# Patient Record
Sex: MALE | NOT HISPANIC OR LATINO | URBAN - METROPOLITAN AREA
[De-identification: names, ages, dates, MRNs, and addresses within clinical notes are randomized per-mention and may not be internally consistent; named-entity substitution may affect disease eponyms.]

---

## 2023-05-08 PROBLEM — Z00.00 ENCOUNTER FOR PREVENTIVE HEALTH EXAMINATION: Status: ACTIVE | Noted: 2023-05-08

## 2023-05-29 ENCOUNTER — OUTPATIENT (OUTPATIENT)
Dept: OUTPATIENT SERVICES | Facility: HOSPITAL | Age: 43
LOS: 1 days | Discharge: HOME | End: 2023-05-29

## 2023-05-29 DIAGNOSIS — R79.9 ABNORMAL FINDING OF BLOOD CHEMISTRY, UNSPECIFIED: ICD-10-CM

## 2023-05-29 DIAGNOSIS — D64.9 ANEMIA, UNSPECIFIED: ICD-10-CM

## 2023-06-05 ENCOUNTER — RESULT REVIEW (OUTPATIENT)
Age: 43
End: 2023-06-05

## 2023-06-05 ENCOUNTER — NON-APPOINTMENT (OUTPATIENT)
Age: 43
End: 2023-06-05

## 2023-06-05 ENCOUNTER — APPOINTMENT (OUTPATIENT)
Dept: HEMATOLOGY ONCOLOGY | Facility: CLINIC | Age: 43
End: 2023-06-05
Payer: COMMERCIAL

## 2023-06-05 VITALS
OXYGEN SATURATION: 95 % | SYSTOLIC BLOOD PRESSURE: 137 MMHG | HEIGHT: 64.57 IN | TEMPERATURE: 98.1 F | BODY MASS INDEX: 21.56 KG/M2 | WEIGHT: 127.87 LBS | DIASTOLIC BLOOD PRESSURE: 87 MMHG | RESPIRATION RATE: 16 BRPM | HEART RATE: 83 BPM

## 2023-06-05 DIAGNOSIS — Z87.09 PERSONAL HISTORY OF OTHER DISEASES OF THE RESPIRATORY SYSTEM: ICD-10-CM

## 2023-06-05 DIAGNOSIS — Z78.9 OTHER SPECIFIED HEALTH STATUS: ICD-10-CM

## 2023-06-05 LAB
BASOPHILS # BLD AUTO: 0.04 K/UL — SIGNIFICANT CHANGE UP (ref 0–0.2)
BASOPHILS NFR BLD AUTO: 0.7 % — SIGNIFICANT CHANGE UP (ref 0–2)
EOSINOPHIL # BLD AUTO: 0.07 K/UL — SIGNIFICANT CHANGE UP (ref 0–0.5)
EOSINOPHIL NFR BLD AUTO: 1.2 % — SIGNIFICANT CHANGE UP (ref 0–6)
ERYTHROCYTE [SEDIMENTATION RATE] IN BLOOD: 21 MM/HR — HIGH (ref 0–15)
HCT VFR BLD CALC: 39.7 % — SIGNIFICANT CHANGE UP (ref 39–50)
HGB BLD-MCNC: 12.3 G/DL — LOW (ref 13–17)
IMM GRANULOCYTES NFR BLD AUTO: 0.3 % — SIGNIFICANT CHANGE UP (ref 0–0.9)
LYMPHOCYTES # BLD AUTO: 1.34 K/UL — SIGNIFICANT CHANGE UP (ref 1–3.3)
LYMPHOCYTES # BLD AUTO: 22.2 % — SIGNIFICANT CHANGE UP (ref 13–44)
MCHC RBC-ENTMCNC: 19.4 PG — LOW (ref 27–34)
MCHC RBC-ENTMCNC: 31 G/DL — LOW (ref 32–36)
MCV RBC AUTO: 62.5 FL — LOW (ref 80–100)
MONOCYTES # BLD AUTO: 0.88 K/UL — SIGNIFICANT CHANGE UP (ref 0–0.9)
MONOCYTES NFR BLD AUTO: 14.6 % — HIGH (ref 2–14)
NEUTROPHILS # BLD AUTO: 3.69 K/UL — SIGNIFICANT CHANGE UP (ref 1.8–7.4)
NEUTROPHILS NFR BLD AUTO: 61 % — SIGNIFICANT CHANGE UP (ref 43–77)
NRBC # BLD: 0 /100 WBCS — SIGNIFICANT CHANGE UP (ref 0–0)
PLATELET # BLD AUTO: 219 K/UL — SIGNIFICANT CHANGE UP (ref 150–400)
RBC # BLD: 6.35 M/UL — HIGH (ref 4.2–5.8)
RBC # FLD: 17.7 % — HIGH (ref 10.3–14.5)
RETICS #: 83.8 K/UL — SIGNIFICANT CHANGE UP (ref 25–125)
RETICS/RBC NFR: 1.3 % — SIGNIFICANT CHANGE UP (ref 0.5–2.5)
WBC # BLD: 6.04 K/UL — SIGNIFICANT CHANGE UP (ref 3.8–10.5)
WBC # FLD AUTO: 6.04 K/UL — SIGNIFICANT CHANGE UP (ref 3.8–10.5)

## 2023-06-05 PROCEDURE — 99205 OFFICE O/P NEW HI 60 MIN: CPT

## 2023-06-05 RX ORDER — TRIAMCINOLONE ACETONIDE 1 MG/G
0.1 CREAM TOPICAL
Qty: 45 | Refills: 0 | Status: DISCONTINUED | COMMUNITY
Start: 2023-02-02

## 2023-06-05 RX ORDER — ALBUTEROL SULFATE 90 UG/1
108 (90 BASE) INHALANT RESPIRATORY (INHALATION)
Qty: 18 | Refills: 0 | Status: DISCONTINUED | COMMUNITY
Start: 2023-04-19

## 2023-06-05 RX ORDER — ACETAMINOPHEN 325 MG/1
325 TABLET ORAL
Qty: 30 | Refills: 0 | Status: DISCONTINUED | COMMUNITY
Start: 2023-01-18

## 2023-06-05 RX ORDER — IPRATROPIUM BROMIDE 21 UG/1
0.03 SPRAY NASAL
Qty: 30 | Refills: 0 | Status: DISCONTINUED | COMMUNITY
Start: 2023-01-18

## 2023-06-05 RX ORDER — BENZONATATE 100 MG/1
100 CAPSULE ORAL
Qty: 30 | Refills: 0 | Status: DISCONTINUED | COMMUNITY
Start: 2023-01-18

## 2023-06-05 RX ORDER — NIRMATRELVIR AND RITONAVIR 300-100 MG
20 X 150 MG & KIT ORAL
Qty: 30 | Refills: 0 | Status: DISCONTINUED | COMMUNITY
Start: 2023-01-18

## 2023-06-05 RX ORDER — COVID-19 ANTIGEN TEST
KIT MISCELLANEOUS
Qty: 8 | Refills: 0 | Status: DISCONTINUED | COMMUNITY
Start: 2023-01-18

## 2023-06-05 RX ORDER — DEXTROMETHORPHAN HYDROBROMIDE AND GUAIFENESIN 10; 100 MG/5ML; MG/5ML
100-10 LIQUID ORAL
Qty: 120 | Refills: 0 | Status: DISCONTINUED | COMMUNITY
Start: 2023-04-19

## 2023-06-05 NOTE — CONSULT LETTER
[Dear  ___] : Dear  [unfilled], [Consult Letter:] : I had the pleasure of evaluating your patient, [unfilled]. [Please see my note below.] : Please see my note below. [Consult Closing:] : Thank you very much for allowing me to participate in the care of this patient.  If you have any questions, please do not hesitate to contact me. [Sincerely,] : Sincerely, [FreeTextEntry2] : Dr Pinon Sang

## 2023-06-05 NOTE — REVIEW OF SYSTEMS
[Negative] : Allergic/Immunologic [Fever] : fever [Recent Change In Weight] : ~T recent weight change [Cough] : cough [SOB on Exertion] : shortness of breath during exertion [FreeTextEntry6] : pleuritic pain

## 2023-06-05 NOTE — HISTORY OF PRESENT ILLNESS
[0 - No Distress] : Distress Level: 0 [90: Able to carry normal activity; minor signs or symptoms of disease.] : 90: Able to carry normal activity; minor signs or symptoms of disease.  [de-identified] : 41 yo gentleman with PMhx of asthma, bronchitis, referred for evaluation of anemia.\par \par Positive for fever 102 48 hrs ago ( positive for running nose, watery eyes, sneezing, cough yellowish phlegm). reports history of seasonal allergies. Denies night sweats, lost 5 pounds in 2 months. Reports having intermittent melena, unable to recall the time. Denies history of hemorrhoids. \par \par Labs WBC 4.6, Hb 12.7g/dl, hct 43%, MCV 63.8%, , ANC 2447, lymphocytes 1348, monocytes 442, eos 304, basos 60.

## 2023-06-05 NOTE — ASSESSMENT
[FreeTextEntry1] : 41 yo gentleman with PMhx of asthma, bronchitis, referred for evaluation of anemia.\par \par Positive for fever 102 48 hrs ago ( positive for running nose, watery eyes, sneezing, cough yellowish phlegm). reports history of seasonal allergies. Denies night sweats, lost 5 pounds in 2 months. Reports having intermittent melena, unable to recall the time. Denies history of hemorrhoids. \par \par Labs WBC 4.6, Hb 12.7g/dl, hct 43%, MCV 63.8%, , ANC 2447, lymphocytes 1348, monocytes 442, eos 304, basos 60. \par \par I had a detailed discussion today with the patient  and wife regarding the natural history, epidemiology, diagnosis,  and treatment of  anemia and elevated protein. I reviewed his laboratory  studies in detail today. I then recommended doing a complete anemia work up and MM work up.Will call patient with results and further recommendations.  I answered all their questions to satisfaction.\par \par Greater than 50% of the encounter time was spent on counseling and coordination of care for anemia/elevated protein     and I have spent  60   minutes of face to face time with the patient.\par \par RTC prn. \par \par \par

## 2023-06-06 LAB
ALBUMIN SERPL ELPH-MCNC: 4.7 G/DL
ALP BLD-CCNC: 73 U/L
ALT SERPL-CCNC: 17 U/L
ANION GAP SERPL CALC-SCNC: 15 MMOL/L
AST SERPL-CCNC: 17 U/L
B2 MICROGLOB SERPL-MCNC: 2.4 MG/L
BILIRUB SERPL-MCNC: 0.3 MG/DL
BUN SERPL-MCNC: 15 MG/DL
CALCIUM SERPL-MCNC: 9 MG/DL
CHLORIDE SERPL-SCNC: 101 MMOL/L
CO2 SERPL-SCNC: 24 MMOL/L
CREAT SERPL-MCNC: 0.86 MG/DL
CRP SERPL-MCNC: 27 MG/L
DEPRECATED KAPPA LC FREE/LAMBDA SER: 1.22 RATIO
EGFR: 111 ML/MIN/1.73M2
FERRITIN SERPL-MCNC: 555 NG/ML
FOLATE SERPL-MCNC: 10.8 NG/ML
FREE KAPPA URINE: 112.96 MG/L
FREE KAPPA/LAMDA RATIO: 4.89 RATIO
FREE LAMDA URINE: 23.1 MG/L
GLUCOSE SERPL-MCNC: 91 MG/DL
IGA SER QL IEP: 235 MG/DL
IGG SER QL IEP: 1995 MG/DL
IGM SER QL IEP: 60 MG/DL
IRON SATN MFR SERPL: 9 %
IRON SERPL-MCNC: 23 UG/DL
KAPPA LC CSF-MCNC: 2.78 MG/DL
KAPPA LC SERPL-MCNC: 3.4 MG/DL
LDH SERPL-CCNC: 183 U/L
POTASSIUM SERPL-SCNC: 4.1 MMOL/L
PROT SERPL-MCNC: 8.1 G/DL
SODIUM SERPL-SCNC: 139 MMOL/L
TIBC SERPL-MCNC: 248 UG/DL
UIBC SERPL-MCNC: 225 UG/DL
VIT B12 SERPL-MCNC: 484 PG/ML

## 2023-06-08 ENCOUNTER — NON-APPOINTMENT (OUTPATIENT)
Age: 43
End: 2023-06-08

## 2023-06-08 LAB
ALBUMIN MFR SERPL ELPH: 52.9 %
ALBUMIN SERPL-MCNC: 4.3 G/DL
ALBUMIN/GLOB SERPL: 1.1 RATIO
ALBUPE: 8.7 %
ALPHA1 GLOB MFR SERPL ELPH: 4.8 %
ALPHA1 GLOB SERPL ELPH-MCNC: 0.4 G/DL
ALPHA1UPE: 46.6 %
ALPHA2 GLOB MFR SERPL ELPH: 9.4 %
ALPHA2 GLOB SERPL ELPH-MCNC: 0.8 G/DL
ALPHA2UPE: 15.5 %
B-GLOBULIN MFR SERPL ELPH: 11.2 %
B-GLOBULIN SERPL ELPH-MCNC: 0.9 G/DL
BETAUPE: 16.4 %
GAMMA GLOB FLD ELPH-MCNC: 1.8 G/DL
GAMMA GLOB MFR SERPL ELPH: 21.7 %
GAMMAUPE: 12.8 %
HGB A MFR BLD: 93.2 %
HGB A2 MFR BLD: 5.8 %
HGB F MFR BLD: 1 %
HGB FRACT BLD-IMP: NORMAL
IGA 24H UR QL IFE: NORMAL
INTERPRETATION SERPL IEP-IMP: NORMAL
KAPPA LC 24H UR QL: NORMAL
M PROTEIN SPEC IFE-MCNC: NORMAL
PROT PATTERN 24H UR ELPH-IMP: NORMAL
PROT SERPL-MCNC: 8.1 G/DL
PROT SERPL-MCNC: 8.1 G/DL
PROT UR-MCNC: 9 MG/DL
PROT UR-MCNC: 9 MG/DL

## 2023-06-16 ENCOUNTER — APPOINTMENT (OUTPATIENT)
Dept: HEMATOLOGY ONCOLOGY | Facility: CLINIC | Age: 43
End: 2023-06-16
Payer: COMMERCIAL

## 2023-06-16 ENCOUNTER — LABORATORY RESULT (OUTPATIENT)
Age: 43
End: 2023-06-16

## 2023-06-16 ENCOUNTER — RESULT REVIEW (OUTPATIENT)
Age: 43
End: 2023-06-16

## 2023-06-16 VITALS
TEMPERATURE: 96.4 F | BODY MASS INDEX: 21.93 KG/M2 | SYSTOLIC BLOOD PRESSURE: 125 MMHG | WEIGHT: 130.07 LBS | OXYGEN SATURATION: 99 % | RESPIRATION RATE: 16 BRPM | DIASTOLIC BLOOD PRESSURE: 88 MMHG | HEART RATE: 77 BPM

## 2023-06-16 LAB
BASOPHILS # BLD AUTO: 0.06 K/UL — SIGNIFICANT CHANGE UP (ref 0–0.2)
BASOPHILS NFR BLD AUTO: 0.8 % — SIGNIFICANT CHANGE UP (ref 0–2)
EOSINOPHIL # BLD AUTO: 0.13 K/UL — SIGNIFICANT CHANGE UP (ref 0–0.5)
EOSINOPHIL NFR BLD AUTO: 1.8 % — SIGNIFICANT CHANGE UP (ref 0–6)
HCT VFR BLD CALC: 41.1 % — SIGNIFICANT CHANGE UP (ref 39–50)
HGB BLD-MCNC: 12.7 G/DL — LOW (ref 13–17)
IMM GRANULOCYTES NFR BLD AUTO: 0.4 % — SIGNIFICANT CHANGE UP (ref 0–0.9)
LYMPHOCYTES # BLD AUTO: 1.91 K/UL — SIGNIFICANT CHANGE UP (ref 1–3.3)
LYMPHOCYTES # BLD AUTO: 26 % — SIGNIFICANT CHANGE UP (ref 13–44)
MCHC RBC-ENTMCNC: 18.9 PG — LOW (ref 27–34)
MCHC RBC-ENTMCNC: 30.9 G/DL — LOW (ref 32–36)
MCV RBC AUTO: 61.2 FL — LOW (ref 80–100)
MONOCYTES # BLD AUTO: 0.61 K/UL — SIGNIFICANT CHANGE UP (ref 0–0.9)
MONOCYTES NFR BLD AUTO: 8.3 % — SIGNIFICANT CHANGE UP (ref 2–14)
NEUTROPHILS # BLD AUTO: 4.62 K/UL — SIGNIFICANT CHANGE UP (ref 1.8–7.4)
NEUTROPHILS NFR BLD AUTO: 62.7 % — SIGNIFICANT CHANGE UP (ref 43–77)
NRBC # BLD: 0 /100 WBCS — SIGNIFICANT CHANGE UP (ref 0–0)
PLATELET # BLD AUTO: 310 K/UL — SIGNIFICANT CHANGE UP (ref 150–400)
RBC # BLD: 6.72 M/UL — HIGH (ref 4.2–5.8)
RBC # FLD: 17.5 % — HIGH (ref 10.3–14.5)
WBC # BLD: 7.36 K/UL — SIGNIFICANT CHANGE UP (ref 3.8–10.5)
WBC # FLD AUTO: 7.36 K/UL — SIGNIFICANT CHANGE UP (ref 3.8–10.5)

## 2023-06-16 PROCEDURE — 38221 DX BONE MARROW BIOPSIES: CPT | Mod: RT

## 2023-06-16 NOTE — REASON FOR VISIT
[Bone Marrow Biopsy] : bone marrow biopsy [Bone Marrow Aspiration] : bone marrow aspiration [FreeTextEntry2] : Pt with elevated light chains. R/o MGUS vs plasma cell dyscrasia

## 2023-06-16 NOTE — PROCEDURE
[Bone Marrow Biopsy] : bone marrow biopsy [Bone Marrow Aspiration] : bone marrow aspiration  [Patient] : the patient [Verbal Consent Obtained] : verbal consent was obtained prior to the procedure [Patient identification verified] : patient identification verified [Procedure verified and consent obtained] : procedure verified and consent obtained [Laterality verified and correct site marked] : laterality verified and correct site marked [Right] : site: right [Correct positioning] : correct positioning [Prone] : prone [Superior iliac spine was identified] : the superior iliac spine was identified. [The right posterior iliac crest was prepped with betadine and draped, using sterile technique.] : The right posterior iliac crest was prepped with betadine and draped, using sterile technique. [Lidocaine was injected and into the periosteum overlying the site.] : Lidocaine was injected and into the periosteum overlying the site. [Aspirate] : aspirate [Cytogenetics] : cytogenetics [FISH] : FISH [Biopsy] : biopsy [Flow Cytometry] : flow cytometry [] : The patient was instructed to remove the bandage the following AM. The patient may bathe. Acetaminophen may be taken for discomfort, as per package directions.If there are any other problems, the patient was instructed to call the office. The patient verbalized understanding, and is aware of the office contact numbers. [FreeTextEntry1] : Pt with elevated light chains. R/o MGUS vs plasma cell dyscrasia [FreeTextEntry2] : 8 cc of 1% Lidocaine was used for the procedure\par \par WBC: 7.36 K/ul\par Hgb: 12.7 g/dL\par Hct: 41.1 %\par Plts: 310 K/uL\par \par Bone marrow aspiration and biopsy were done. Myeloma panel and congo red stain requested. \par Pressure applied > 15 min - no bleeding noted.

## 2024-03-18 ENCOUNTER — APPOINTMENT (OUTPATIENT)
Dept: PODIATRY | Facility: CLINIC | Age: 44
End: 2024-03-18

## 2024-03-28 ENCOUNTER — APPOINTMENT (OUTPATIENT)
Dept: PODIATRY | Facility: CLINIC | Age: 44
End: 2024-03-28
Payer: MEDICAID

## 2024-03-28 DIAGNOSIS — M21.40 FLAT FOOT [PES PLANUS] (ACQUIRED), UNSPECIFIED FOOT: ICD-10-CM

## 2024-03-28 DIAGNOSIS — M77.40 METATARSALGIA, UNSPECIFIED FOOT: ICD-10-CM

## 2024-03-28 DIAGNOSIS — L85.1 ACQUIRED KERATOSIS [KERATODERMA] PALMARIS ET PLANTARIS: ICD-10-CM

## 2024-03-28 DIAGNOSIS — M79.673 PAIN IN UNSPECIFIED FOOT: ICD-10-CM

## 2024-03-28 DIAGNOSIS — M77.8 OTHER ENTHESOPATHIES, NOT ELSEWHERE CLASSIFIED: ICD-10-CM

## 2024-03-28 PROCEDURE — 99203 OFFICE O/P NEW LOW 30 MIN: CPT

## 2024-04-01 PROBLEM — M79.673 PAIN, FOOT: Status: ACTIVE | Noted: 2024-04-01

## 2024-04-01 PROBLEM — L85.1 KERATODERMA, ACQUIRED: Status: ACTIVE | Noted: 2024-04-01

## 2024-04-01 PROBLEM — M21.40 FLATFOOT: Status: ACTIVE | Noted: 2024-04-01

## 2024-04-01 PROBLEM — M77.8 CAPSULITIS OF FOOT: Status: ACTIVE | Noted: 2024-04-01

## 2024-04-01 PROBLEM — M77.40 METATARSALGIA: Status: ACTIVE | Noted: 2024-04-01

## 2024-04-02 NOTE — HISTORY OF PRESENT ILLNESS
[FreeTextEntry1] : Patient presents today with complaint of multiple lesions on the bottom of both feet, which he states started to increase in pain over the past several months to a year. He states he has had issues in the past and was taken which was taken care of, but they returned. He also states that he had recently moved from an apartment in the city several years ago and has wood floors. He does not wear slippers or shoe gear in the house. He is usually barefoot or in socks. He presents today wearing sneaker. He is unrestricted on the type of shoe gear that he has to wear in regards to work. He denies any trauma to the feet.

## 2024-04-02 NOTE — PHYSICAL EXAM
[2+] : left foot dorsalis pedis 2+ [Varicose Veins Of Lower Extremities] : not present [FreeTextEntry3] : Temperature gradient within normal limits. CFT: 3 seconds x10  There is positive hair growth. 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 Medial collapse of the medial arch and flexible pes planus on the right foot. There is tailor's bunion on the left foot with a prominent 2nd metatarsal head on the right foot and a prominent 5th metatarsal head on the left. There is tenderness on palpation submet. 2, but negative pain on ROM of the lesser MPJ's or on palpation of the 2nd MPJ. Negative pain on ROM of the pedal joints. Muscle strength is 5/5.  [FreeTextEntry1] : Epicritic sensation and light touch are intact.

## 2024-04-02 NOTE — ASSESSMENT
[FreeTextEntry1] : Impression: Metatarsalgia. Pain in foot. Capsulitis right foot. Pes planus. IPK.   Treatment: Discussed findings and conditions with the patient. With patient's consent, all lesions were prepped and manually and mechanically debrided with a sterile #15 blade and trimmed without incidence. There was negative bleeding or underlying ulceration, foreign body or signs of verruca noted post-debridement. Discussed biomechanics with the patient. I recommended Powerstep 3/4 length insoles for shoes to try to off-load the metatarsal heads and properly align the foot to help off-load the 2nd MPJ and improve the biomechanics bilaterally. Discussed Oofos for the house. Avoid waling barefoot. Shoe gear was also discussed with the patient in depth. A metatarsal pad was applied to the insole of his right shoe, which he states felt comfortable upon leaving the office. Discussed possible recurrence of the lesions. He is to return if there is any increase in pain, swelling, changes in appearance of the foot, recurrence of the hyperkeratotic lesions. Discussed shoe gear modification and also he is to return at the next visit with shoes to discuss additional modifications, recommendations, possible insoles, orthotics. Patient is to return approximately 1 to 2 months or sooner if necessary.

## 2024-04-26 ENCOUNTER — OUTPATIENT (OUTPATIENT)
Dept: OUTPATIENT SERVICES | Facility: HOSPITAL | Age: 44
LOS: 1 days | Discharge: HOME | End: 2024-04-26

## 2024-04-26 DIAGNOSIS — R79.9 ABNORMAL FINDING OF BLOOD CHEMISTRY, UNSPECIFIED: ICD-10-CM

## 2024-04-26 DIAGNOSIS — D64.9 ANEMIA, UNSPECIFIED: ICD-10-CM

## 2024-04-30 DIAGNOSIS — R77.8 OTHER SPECIFIED ABNORMALITIES OF PLASMA PROTEINS: ICD-10-CM

## 2024-05-06 ENCOUNTER — NON-APPOINTMENT (OUTPATIENT)
Age: 44
End: 2024-05-06

## 2024-05-07 ENCOUNTER — LABORATORY RESULT (OUTPATIENT)
Age: 44
End: 2024-05-07

## 2024-05-07 ENCOUNTER — APPOINTMENT (OUTPATIENT)
Dept: HEMATOLOGY ONCOLOGY | Facility: CLINIC | Age: 44
End: 2024-05-07
Payer: MEDICAID

## 2024-05-07 ENCOUNTER — RESULT REVIEW (OUTPATIENT)
Age: 44
End: 2024-05-07

## 2024-05-07 VITALS
HEART RATE: 76 BPM | HEIGHT: 64.57 IN | OXYGEN SATURATION: 98 % | RESPIRATION RATE: 16 BRPM | DIASTOLIC BLOOD PRESSURE: 88 MMHG | WEIGHT: 136.68 LBS | SYSTOLIC BLOOD PRESSURE: 130 MMHG | BODY MASS INDEX: 23.05 KG/M2 | TEMPERATURE: 98.5 F

## 2024-05-07 DIAGNOSIS — D64.9 ANEMIA, UNSPECIFIED: ICD-10-CM

## 2024-05-07 LAB
BASOPHILS # BLD AUTO: 0.08 K/UL — SIGNIFICANT CHANGE UP (ref 0–0.2)
BASOPHILS NFR BLD AUTO: 1.3 % — SIGNIFICANT CHANGE UP (ref 0–2)
EOSINOPHIL # BLD AUTO: 0.26 K/UL — SIGNIFICANT CHANGE UP (ref 0–0.5)
EOSINOPHIL NFR BLD AUTO: 4.3 % — SIGNIFICANT CHANGE UP (ref 0–6)
HCT VFR BLD CALC: 41.8 % — SIGNIFICANT CHANGE UP (ref 39–50)
HGB BLD-MCNC: 12.9 G/DL — LOW (ref 13–17)
IMM GRANULOCYTES NFR BLD AUTO: 0.3 % — SIGNIFICANT CHANGE UP (ref 0–0.9)
LYMPHOCYTES # BLD AUTO: 1.93 K/UL — SIGNIFICANT CHANGE UP (ref 1–3.3)
LYMPHOCYTES # BLD AUTO: 32.1 % — SIGNIFICANT CHANGE UP (ref 13–44)
MCHC RBC-ENTMCNC: 19.3 PG — LOW (ref 27–34)
MCHC RBC-ENTMCNC: 30.9 G/DL — LOW (ref 32–36)
MCV RBC AUTO: 62.7 FL — LOW (ref 80–100)
MONOCYTES # BLD AUTO: 0.62 K/UL — SIGNIFICANT CHANGE UP (ref 0–0.9)
MONOCYTES NFR BLD AUTO: 10.3 % — SIGNIFICANT CHANGE UP (ref 2–14)
NEUTROPHILS # BLD AUTO: 3.1 K/UL — SIGNIFICANT CHANGE UP (ref 1.8–7.4)
NEUTROPHILS NFR BLD AUTO: 51.7 % — SIGNIFICANT CHANGE UP (ref 43–77)
NRBC # BLD: 0 /100 WBCS — SIGNIFICANT CHANGE UP (ref 0–0)
PLATELET # BLD AUTO: 301 K/UL — SIGNIFICANT CHANGE UP (ref 150–400)
RBC # BLD: 6.67 M/UL — HIGH (ref 4.2–5.8)
RBC # FLD: 17.9 % — HIGH (ref 10.3–14.5)
WBC # BLD: 6.01 K/UL — SIGNIFICANT CHANGE UP (ref 3.8–10.5)
WBC # FLD AUTO: 6.01 K/UL — SIGNIFICANT CHANGE UP (ref 3.8–10.5)

## 2024-05-07 PROCEDURE — 99214 OFFICE O/P EST MOD 30 MIN: CPT

## 2024-05-07 NOTE — ASSESSMENT
[FreeTextEntry1] : 42 yo gentleman with PMhx of asthma, bronchitis, referred for evaluation of anemia.  Positive for fever 102 48 hrs ago ( positive for running nose, watery eyes, sneezing, cough yellowish phlegm). reports history of seasonal allergies. Denies night sweats, lost 5 pounds in 2 months. Reports having intermittent melena, unable to recall the time. Denies history of hemorrhoids.   Labs :  Hb 12.9 g/dl, hct 41.8%, MCV 62.7%, .  Will check hemoglobin electrophoresis to r/o thalassemia minor.  Greater than 50% of the encounter time was spent on counseling and coordination of care for anemia/elevated protein     and I have spent 30   minutes of face to face time with the patient.  RTC prn.

## 2024-05-07 NOTE — REVIEW OF SYSTEMS
[Fever] : no fever [Recent Change In Weight] : ~T no recent weight change [Cough] : no cough [SOB on Exertion] : shortness of breath during exertion [Negative] : Constitutional

## 2024-05-07 NOTE — HISTORY OF PRESENT ILLNESS
[0 - No Distress] : Distress Level: 0 [90: Able to carry normal activity; minor signs or symptoms of disease.] : 90: Able to carry normal activity; minor signs or symptoms of disease.  [de-identified] : 43 yo gentleman with PMhx of asthma, bronchitis, referred for evaluation of anemia.\par  \par  Positive for fever 102 48 hrs ago ( positive for running nose, watery eyes, sneezing, cough yellowish phlegm). reports history of seasonal allergies. Denies night sweats, lost 5 pounds in 2 months. Reports having intermittent melena, unable to recall the time. Denies history of hemorrhoids. \par  \par  Labs WBC 4.6, Hb 12.7g/dl, hct 43%, MCV 63.8%, , ANC 2447, lymphocytes 1348, monocytes 442, eos 304, basos 60.  [de-identified] : Patient is feeling well.  6/21/23 BMB was normal. Normal FISH.   Patient's mother has thalassemia minor.  No other changes in medical, surgical or social history since 6/5/23.

## 2024-05-08 ENCOUNTER — TRANSCRIPTION ENCOUNTER (OUTPATIENT)
Age: 44
End: 2024-05-08

## 2024-05-09 LAB
ALBUMIN SERPL ELPH-MCNC: 4.8 G/DL
ALBUPE: 14.1 %
ALP BLD-CCNC: 64 U/L
ALPHA1UPE: 29.1 %
ALPHA2UPE: 20.1 %
ALT SERPL-CCNC: 24 U/L
ANION GAP SERPL CALC-SCNC: 13 MMOL/L
AST SERPL-CCNC: 23 U/L
B2 MICROGLOB SERPL-MCNC: 1.9 MG/L
BASOPHILS # BLD AUTO: 0.07 K/UL
BASOPHILS NFR BLD AUTO: 1.1 %
BETAUPE: 20.3 %
BILIRUB SERPL-MCNC: 0.4 MG/DL
BUN SERPL-MCNC: 19 MG/DL
CALCIUM SERPL-MCNC: 8.8 MG/DL
CHLORIDE SERPL-SCNC: 101 MMOL/L
CO2 SERPL-SCNC: 24 MMOL/L
CREAT SERPL-MCNC: 0.97 MG/DL
EGFR: 99 ML/MIN/1.73M2
EOSINOPHIL # BLD AUTO: 0.24 K/UL
EOSINOPHIL NFR BLD AUTO: 3.8 %
FERRITIN SERPL-MCNC: 539 NG/ML
FREE KAPPA URINE: 30.58 MG/L
FREE KAPPA/LAMDA RATIO: 2.95 RATIO
FREE LAMDA URINE: 10.36 MG/L
GAMMAUPE: 16.4 %
GLUCOSE SERPL-MCNC: 92 MG/DL
HCT VFR BLD CALC: 45 %
HGB A MFR BLD: 93.4 %
HGB A2 MFR BLD: 5.8 %
HGB BLD-MCNC: 13.1 G/DL
HGB F MFR BLD: 0.8 %
HGB FRACT BLD-IMP: NORMAL
IGA 24H UR QL IFE: NORMAL
IMM GRANULOCYTES NFR BLD AUTO: 0.2 %
IRON SATN MFR SERPL: 18 %
IRON SERPL-MCNC: 59 UG/DL
KAPPA LC 24H UR QL: NORMAL
LDH SERPL-CCNC: 200 U/L
LYMPHOCYTES # BLD AUTO: 2.03 K/UL
LYMPHOCYTES NFR BLD AUTO: 32.5 %
MAN DIFF?: NORMAL
MCHC RBC-ENTMCNC: 19.1 PG
MCHC RBC-ENTMCNC: 29.1 GM/DL
MCV RBC AUTO: 65.5 FL
MONOCYTES # BLD AUTO: 0.7 K/UL
MONOCYTES NFR BLD AUTO: 11.2 %
NEUTROPHILS # BLD AUTO: 3.19 K/UL
NEUTROPHILS NFR BLD AUTO: 51.2 %
PLATELET # BLD AUTO: 307 K/UL
POTASSIUM SERPL-SCNC: 4.1 MMOL/L
PROT PATTERN 24H UR ELPH-IMP: NORMAL
PROT SERPL-MCNC: 8.3 G/DL
PROT UR-MCNC: 7 MG/DL
PROT UR-MCNC: 7 MG/DL
PROT UR-MCNC: 8 MG/DL
RBC # BLD: 6.87 M/UL
RBC # FLD: 19.6 %
SODIUM SERPL-SCNC: 138 MMOL/L
TIBC SERPL-MCNC: 326 UG/DL
UIBC SERPL-MCNC: 267 UG/DL
WBC # FLD AUTO: 6.24 K/UL

## 2024-05-13 LAB
ALBUMIN MFR SERPL ELPH: 55.4 %
ALBUMIN SERPL-MCNC: 4.6 G/DL
ALBUMIN/GLOB SERPL: 1.2 RATIO
ALPHA1 GLOB MFR SERPL ELPH: 3.1 %
ALPHA1 GLOB SERPL ELPH-MCNC: 0.3 G/DL
ALPHA2 GLOB MFR SERPL ELPH: 7 %
ALPHA2 GLOB SERPL ELPH-MCNC: 0.6 G/DL
B-GLOBULIN MFR SERPL ELPH: 11 %
B-GLOBULIN SERPL ELPH-MCNC: 0.9 G/DL
DEPRECATED KAPPA LC FREE/LAMBDA SER: 1.19 RATIO
GAMMA GLOB FLD ELPH-MCNC: 2 G/DL
GAMMA GLOB MFR SERPL ELPH: 23.5 %
IGA SER QL IEP: 235 MG/DL
IGG SER QL IEP: 2007 MG/DL
IGM SER QL IEP: 77 MG/DL
INTERPRETATION SERPL IEP-IMP: NORMAL
KAPPA LC CSF-MCNC: 2.34 MG/DL
KAPPA LC SERPL-MCNC: 2.78 MG/DL
M PROTEIN SPEC IFE-MCNC: NORMAL
PROT SERPL-MCNC: 8.3 G/DL
PROT SERPL-MCNC: 8.3 G/DL

## 2025-02-24 ENCOUNTER — INPATIENT (INPATIENT)
Facility: HOSPITAL | Age: 45
LOS: 1 days | Discharge: HOME | End: 2025-02-26
Attending: STUDENT IN AN ORGANIZED HEALTH CARE EDUCATION/TRAINING PROGRAM | Admitting: STUDENT IN AN ORGANIZED HEALTH CARE EDUCATION/TRAINING PROGRAM
Payer: SELF-PAY

## 2025-02-24 VITALS
DIASTOLIC BLOOD PRESSURE: 76 MMHG | SYSTOLIC BLOOD PRESSURE: 131 MMHG | HEART RATE: 84 BPM | HEIGHT: 66 IN | WEIGHT: 130.07 LBS | TEMPERATURE: 99 F | RESPIRATION RATE: 16 BRPM | OXYGEN SATURATION: 99 %

## 2025-02-24 PROCEDURE — 99285 EMERGENCY DEPT VISIT HI MDM: CPT

## 2025-02-24 NOTE — ED ADULT TRIAGE NOTE - CHIEF COMPLAINT QUOTE
Pt arrives to ED c/o abd pain starting today but reports N/V starting Friday with itchy throat and congestion and chills.  Pt having solid bowel movements today, denies diarrhea.   Hx: asthma, bronchitis

## 2025-02-25 ENCOUNTER — RESULT REVIEW (OUTPATIENT)
Age: 45
End: 2025-02-25

## 2025-02-25 ENCOUNTER — TRANSCRIPTION ENCOUNTER (OUTPATIENT)
Age: 45
End: 2025-02-25

## 2025-02-25 DIAGNOSIS — K35.80 UNSPECIFIED ACUTE APPENDICITIS: ICD-10-CM

## 2025-02-25 LAB
ADD ON TEST-SPECIMEN IN LAB: SIGNIFICANT CHANGE UP
ALBUMIN SERPL ELPH-MCNC: 4.3 G/DL — SIGNIFICANT CHANGE UP (ref 3.3–5)
ALP SERPL-CCNC: 71 U/L — SIGNIFICANT CHANGE UP (ref 40–120)
ALT FLD-CCNC: 20 U/L — SIGNIFICANT CHANGE UP (ref 4–41)
ANION GAP SERPL CALC-SCNC: 16 MMOL/L — HIGH (ref 7–14)
APTT BLD: 29.8 SEC — SIGNIFICANT CHANGE UP (ref 24.5–35.6)
AST SERPL-CCNC: 20 U/L — SIGNIFICANT CHANGE UP (ref 4–40)
BASOPHILS # BLD AUTO: 0.04 K/UL — SIGNIFICANT CHANGE UP (ref 0–0.2)
BASOPHILS NFR BLD AUTO: 0.3 % — SIGNIFICANT CHANGE UP (ref 0–2)
BILIRUB SERPL-MCNC: 0.6 MG/DL — SIGNIFICANT CHANGE UP (ref 0.2–1.2)
BLD GP AB SCN SERPL QL: NEGATIVE — SIGNIFICANT CHANGE UP
BUN SERPL-MCNC: 15 MG/DL — SIGNIFICANT CHANGE UP (ref 7–23)
CALCIUM SERPL-MCNC: 8.5 MG/DL — SIGNIFICANT CHANGE UP (ref 8.4–10.5)
CHLORIDE SERPL-SCNC: 97 MMOL/L — LOW (ref 98–107)
CO2 SERPL-SCNC: 23 MMOL/L — SIGNIFICANT CHANGE UP (ref 22–31)
CREAT SERPL-MCNC: 0.81 MG/DL — SIGNIFICANT CHANGE UP (ref 0.5–1.3)
EGFR: 112 ML/MIN/1.73M2 — SIGNIFICANT CHANGE UP
EOSINOPHIL # BLD AUTO: 0.17 K/UL — SIGNIFICANT CHANGE UP (ref 0–0.5)
EOSINOPHIL NFR BLD AUTO: 1.3 % — SIGNIFICANT CHANGE UP (ref 0–6)
FLUAV AG NPH QL: SIGNIFICANT CHANGE UP
FLUBV AG NPH QL: SIGNIFICANT CHANGE UP
GLUCOSE SERPL-MCNC: 120 MG/DL — HIGH (ref 70–99)
HCT VFR BLD CALC: 40.1 % — SIGNIFICANT CHANGE UP (ref 39–50)
HGB BLD-MCNC: 12.2 G/DL — LOW (ref 13–17)
IANC: 11.65 K/UL — HIGH (ref 1.8–7.4)
IMM GRANULOCYTES NFR BLD AUTO: 0.5 % — SIGNIFICANT CHANGE UP (ref 0–0.9)
INR BLD: 1.02 RATIO — SIGNIFICANT CHANGE UP (ref 0.85–1.16)
LIDOCAIN IGE QN: 32 U/L — SIGNIFICANT CHANGE UP (ref 7–60)
LYMPHOCYTES # BLD AUTO: 0.62 K/UL — LOW (ref 1–3.3)
LYMPHOCYTES # BLD AUTO: 4.7 % — LOW (ref 13–44)
MAGNESIUM SERPL-MCNC: 2.2 MG/DL — SIGNIFICANT CHANGE UP (ref 1.6–2.6)
MCHC RBC-ENTMCNC: 18.9 PG — LOW (ref 27–34)
MCHC RBC-ENTMCNC: 30.4 G/DL — LOW (ref 32–36)
MCV RBC AUTO: 62 FL — LOW (ref 80–100)
MONOCYTES # BLD AUTO: 0.59 K/UL — SIGNIFICANT CHANGE UP (ref 0–0.9)
MONOCYTES NFR BLD AUTO: 4.5 % — SIGNIFICANT CHANGE UP (ref 2–14)
NEUTROPHILS # BLD AUTO: 11.65 K/UL — HIGH (ref 1.8–7.4)
NEUTROPHILS NFR BLD AUTO: 88.7 % — HIGH (ref 43–77)
NRBC # BLD AUTO: 0 K/UL — SIGNIFICANT CHANGE UP (ref 0–0)
NRBC # FLD: 0 K/UL — SIGNIFICANT CHANGE UP (ref 0–0)
NRBC BLD AUTO-RTO: 0 /100 WBCS — SIGNIFICANT CHANGE UP (ref 0–0)
PHOSPHATE SERPL-MCNC: 3.5 MG/DL — SIGNIFICANT CHANGE UP (ref 2.5–4.5)
PLATELET # BLD AUTO: 251 K/UL — SIGNIFICANT CHANGE UP (ref 150–400)
POTASSIUM SERPL-MCNC: 4.1 MMOL/L — SIGNIFICANT CHANGE UP (ref 3.5–5.3)
POTASSIUM SERPL-SCNC: 4.1 MMOL/L — SIGNIFICANT CHANGE UP (ref 3.5–5.3)
PROT SERPL-MCNC: 7.6 G/DL — SIGNIFICANT CHANGE UP (ref 6–8.3)
PROTHROM AB SERPL-ACNC: 11.8 SEC — SIGNIFICANT CHANGE UP (ref 9.9–13.4)
RBC # BLD: 6.47 M/UL — HIGH (ref 4.2–5.8)
RBC # FLD: 18.1 % — HIGH (ref 10.3–14.5)
RH IG SCN BLD-IMP: POSITIVE — SIGNIFICANT CHANGE UP
RH IG SCN BLD-IMP: POSITIVE — SIGNIFICANT CHANGE UP
RSV RNA NPH QL NAA+NON-PROBE: SIGNIFICANT CHANGE UP
SARS-COV-2 RNA SPEC QL NAA+PROBE: SIGNIFICANT CHANGE UP
SODIUM SERPL-SCNC: 136 MMOL/L — SIGNIFICANT CHANGE UP (ref 135–145)
WBC # BLD: 13.14 K/UL — HIGH (ref 3.8–10.5)
WBC # FLD AUTO: 13.14 K/UL — HIGH (ref 3.8–10.5)

## 2025-02-25 PROCEDURE — 99222 1ST HOSP IP/OBS MODERATE 55: CPT | Mod: GC,57

## 2025-02-25 PROCEDURE — 88304 TISSUE EXAM BY PATHOLOGIST: CPT | Mod: 26

## 2025-02-25 PROCEDURE — 74177 CT ABD & PELVIS W/CONTRAST: CPT | Mod: 26

## 2025-02-25 PROCEDURE — 44970 LAPAROSCOPY APPENDECTOMY: CPT

## 2025-02-25 PROCEDURE — 76705 ECHO EXAM OF ABDOMEN: CPT | Mod: 26

## 2025-02-25 DEVICE — STAPLER COVIDIEN TRI-STAPLE 45MM TAN RELOAD: Type: IMPLANTABLE DEVICE | Status: FUNCTIONAL

## 2025-02-25 RX ORDER — IBUPROFEN 200 MG
600 TABLET ORAL EVERY 6 HOURS
Refills: 0 | Status: DISCONTINUED | OUTPATIENT
Start: 2025-02-25 | End: 2025-02-26

## 2025-02-25 RX ORDER — ENOXAPARIN SODIUM 100 MG/ML
40 INJECTION SUBCUTANEOUS EVERY 24 HOURS
Refills: 0 | Status: DISCONTINUED | OUTPATIENT
Start: 2025-02-25 | End: 2025-02-26

## 2025-02-25 RX ORDER — OXYCODONE HYDROCHLORIDE 30 MG/1
2.5 TABLET ORAL EVERY 4 HOURS
Refills: 0 | Status: DISCONTINUED | OUTPATIENT
Start: 2025-02-25 | End: 2025-02-26

## 2025-02-25 RX ORDER — PIPERACILLIN-TAZO-DEXTROSE,ISO 3.375G/5
3.38 IV SOLUTION, PIGGYBACK PREMIX FROZEN(ML) INTRAVENOUS EVERY 8 HOURS
Refills: 0 | Status: DISCONTINUED | OUTPATIENT
Start: 2025-02-26 | End: 2025-02-26

## 2025-02-25 RX ORDER — KETOROLAC TROMETHAMINE 30 MG/ML
30 INJECTION, SOLUTION INTRAMUSCULAR; INTRAVENOUS ONCE
Refills: 0 | Status: DISCONTINUED | OUTPATIENT
Start: 2025-02-25 | End: 2025-02-25

## 2025-02-25 RX ORDER — AMOXICILLIN AND CLAVULANATE POTASSIUM 500; 125 MG/1; MG/1
875 TABLET, FILM COATED ORAL
Qty: 8 | Refills: 0
Start: 2025-02-25 | End: 2025-02-28

## 2025-02-25 RX ORDER — OXYCODONE HYDROCHLORIDE 30 MG/1
5 TABLET ORAL EVERY 4 HOURS
Refills: 0 | Status: DISCONTINUED | OUTPATIENT
Start: 2025-02-25 | End: 2025-02-26

## 2025-02-25 RX ORDER — ACETAMINOPHEN 500 MG/5ML
1000 LIQUID (ML) ORAL ONCE
Refills: 0 | Status: COMPLETED | OUTPATIENT
Start: 2025-02-25 | End: 2025-02-25

## 2025-02-25 RX ORDER — OXYCODONE HYDROCHLORIDE 30 MG/1
5 TABLET ORAL ONCE
Refills: 0 | Status: DISCONTINUED | OUTPATIENT
Start: 2025-02-25 | End: 2025-02-25

## 2025-02-25 RX ORDER — ONDANSETRON HCL/PF 4 MG/2 ML
4 VIAL (ML) INJECTION ONCE
Refills: 0 | Status: COMPLETED | OUTPATIENT
Start: 2025-02-25 | End: 2025-02-25

## 2025-02-25 RX ORDER — ACETAMINOPHEN 500 MG/5ML
3 LIQUID (ML) ORAL
Qty: 0 | Refills: 0 | DISCHARGE
Start: 2025-02-25

## 2025-02-25 RX ORDER — IBUPROFEN 200 MG
1 TABLET ORAL
Qty: 0 | Refills: 0 | DISCHARGE
Start: 2025-02-25

## 2025-02-25 RX ORDER — HYDROMORPHONE/SOD CHLOR,ISO/PF 2 MG/10 ML
0.5 SYRINGE (ML) INJECTION
Refills: 0 | Status: DISCONTINUED | OUTPATIENT
Start: 2025-02-25 | End: 2025-02-25

## 2025-02-25 RX ORDER — SODIUM CHLORIDE 9 G/1000ML
1000 INJECTION, SOLUTION INTRAVENOUS ONCE
Refills: 0 | Status: COMPLETED | OUTPATIENT
Start: 2025-02-25 | End: 2025-02-25

## 2025-02-25 RX ORDER — SODIUM CHLORIDE 9 G/1000ML
1000 INJECTION, SOLUTION INTRAVENOUS
Refills: 0 | Status: DISCONTINUED | OUTPATIENT
Start: 2025-02-25 | End: 2025-02-26

## 2025-02-25 RX ORDER — PIPERACILLIN-TAZO-DEXTROSE,ISO 3.375G/5
3.38 IV SOLUTION, PIGGYBACK PREMIX FROZEN(ML) INTRAVENOUS ONCE
Refills: 0 | Status: COMPLETED | OUTPATIENT
Start: 2025-02-25 | End: 2025-02-25

## 2025-02-25 RX ORDER — PIPERACILLIN-TAZO-DEXTROSE,ISO 3.375G/5
3.38 IV SOLUTION, PIGGYBACK PREMIX FROZEN(ML) INTRAVENOUS EVERY 8 HOURS
Refills: 0 | Status: DISCONTINUED | OUTPATIENT
Start: 2025-02-25 | End: 2025-02-25

## 2025-02-25 RX ORDER — ACETAMINOPHEN 500 MG/5ML
1000 LIQUID (ML) ORAL EVERY 6 HOURS
Refills: 0 | Status: DISCONTINUED | OUTPATIENT
Start: 2025-02-25 | End: 2025-02-25

## 2025-02-25 RX ORDER — INFLUENZA A VIRUS A/IDAHO/07/2018 (H1N1) ANTIGEN (MDCK CELL DERIVED, PROPIOLACTONE INACTIVATED, INFLUENZA A VIRUS A/INDIANA/08/2018 (H3N2) ANTIGEN (MDCK CELL DERIVED, PROPIOLACTONE INACTIVATED), INFLUENZA B VIRUS B/SINGAPORE/INFTT-16-0610/2016 ANTIGEN (MDCK CELL DERIVED, PROPIOLACTONE INACTIVATED), INFLUENZA B VIRUS B/IOWA/06/2017 ANTIGEN (MDCK CELL DERIVED, PROPIOLACTONE INACTIVATED) 15; 15; 15; 15 UG/.5ML; UG/.5ML; UG/.5ML; UG/.5ML
0.5 INJECTION, SUSPENSION INTRAMUSCULAR ONCE
Refills: 0 | Status: DISCONTINUED | OUTPATIENT
Start: 2025-02-25 | End: 2025-02-26

## 2025-02-25 RX ORDER — ACETAMINOPHEN 500 MG/5ML
975 LIQUID (ML) ORAL EVERY 6 HOURS
Refills: 0 | Status: DISCONTINUED | OUTPATIENT
Start: 2025-02-25 | End: 2025-02-25

## 2025-02-25 RX ADMIN — Medication 400 MILLIGRAM(S): at 14:04

## 2025-02-25 RX ADMIN — SODIUM CHLORIDE 1000 MILLILITER(S): 9 INJECTION, SOLUTION INTRAVENOUS at 04:58

## 2025-02-25 RX ADMIN — Medication 1000 MILLIGRAM(S): at 21:15

## 2025-02-25 RX ADMIN — Medication 600 MILLIGRAM(S): at 23:39

## 2025-02-25 RX ADMIN — Medication 400 MILLIGRAM(S): at 20:45

## 2025-02-25 RX ADMIN — Medication 4 MILLIGRAM(S): at 00:40

## 2025-02-25 RX ADMIN — Medication 1000 MILLIGRAM(S): at 10:48

## 2025-02-25 RX ADMIN — Medication 400 MILLIGRAM(S): at 09:48

## 2025-02-25 RX ADMIN — Medication 1000 MILLIGRAM(S): at 15:00

## 2025-02-25 RX ADMIN — SODIUM CHLORIDE 100 MILLILITER(S): 9 INJECTION, SOLUTION INTRAVENOUS at 06:07

## 2025-02-25 RX ADMIN — Medication 200 GRAM(S): at 04:04

## 2025-02-25 RX ADMIN — Medication 25 GRAM(S): at 23:39

## 2025-02-25 RX ADMIN — Medication 4 MILLIGRAM(S): at 04:04

## 2025-02-25 RX ADMIN — Medication 25 GRAM(S): at 09:51

## 2025-02-25 RX ADMIN — KETOROLAC TROMETHAMINE 30 MILLIGRAM(S): 30 INJECTION, SOLUTION INTRAMUSCULAR; INTRAVENOUS at 04:04

## 2025-02-25 RX ADMIN — Medication 1000 MILLILITER(S): at 00:39

## 2025-02-25 RX ADMIN — Medication 400 MILLIGRAM(S): at 00:40

## 2025-02-25 RX ADMIN — Medication 25 GRAM(S): at 16:35

## 2025-02-25 NOTE — CHART NOTE - NSCHARTNOTEFT_GEN_A_CORE
Post Operative Note    Procedure: S/P laparoscopic appendectomy    Subjective: Patient seen at bedside for post-op check. Patient states they have no acute concerns to report at this time. Denies any chest pain, fever, chills, nausea, vomiting, shortness of breath at this time.      Objective:  Vitals: T(F): 99 (02-25-25 @ 21:00), Max: 101.6 (02-25-25 @ 12:44)  HR: 96 (02-25-25 @ 21:30)  BP: 106/71 (02-25-25 @ 21:30) (93/55 - 134/75)  RR: 16 (02-25-25 @ 21:30)  SpO2: 100% (02-25-25 @ 21:30)  Vent Settings:     In:   02-25-25 @ 07:01  -  02-25-25 @ 23:46  --------------------------------------------------------  IN: 680 mL      IV Fluids: lactated ringers. 1000 milliLiter(s) (100 mL/Hr) IV Continuous <Continuous>      Out:   02-25-25 @ 07:01  -  02-25-25 @ 23:46  --------------------------------------------------------  OUT: 0 mL        Voided Urine:   02-25-25 @ 07:01  -  02-25-25 @ 23:46  --------------------------------------------------------  OUT: 0 mL        Physical Examination:  General: NAD, resting comfortably in bed  HEENT: Normocephalic atraumatic  Respiratory: Nonlabored respirations, normal CW expansion.  Cardio: pulse present  Abdomen: softly distended, appropriately tender, surgical incisions are c/d/i.  Extremities: warm and well perfused.         Assessment:  44yMale patient S/P lap appy. Progressing well in the immediate post op period.    Plan:  - Pain control PRN  - Diet: Regular  - Activity: OOBAT  - DVT ppx: lovenox    Date/Time: 02-25-25 @ 23:46

## 2025-02-25 NOTE — H&P ADULT - NSHPLABSRESULTS_GEN_ALL_CORE
.  LABS:                         12.2   13.14 )-----------( 251      ( 25 Feb 2025 00:32 )             40.1     02-25    136  |  97[L]  |  15  ----------------------------<  120[H]  4.1   |  23  |  0.81    Ca    8.5      25 Feb 2025 00:32    TPro  7.6  /  Alb  4.3  /  TBili  0.6  /  DBili  x   /  AST  20  /  ALT  20  /  AlkPhos  71  02-25      Urinalysis Basic - ( 25 Feb 2025 00:32 )    Color: x / Appearance: x / SG: x / pH: x  Gluc: 120 mg/dL / Ketone: x  / Bili: x / Urobili: x   Blood: x / Protein: x / Nitrite: x   Leuk Esterase: x / RBC: x / WBC x   Sq Epi: x / Non Sq Epi: x / Bacteria: x            RADIOLOGY, EKG & ADDITIONAL TESTS: Reviewed.

## 2025-02-25 NOTE — ASU DISCHARGE PLAN (ADULT/PEDIATRIC) - ASU DC SPECIAL INSTRUCTIONSFT
WOUND CARE: Keep incision clean and dry.  There are white bandages directly adherent to your skin called Steri Strips. Do not remove the Steri Strips; they will fall off on their own after approximately seven days.  PAIN CONTROL: You may take Tylenol (Acetaminophen) up to 1000mg every 6 hours, and Motrin (Ibuprofen) 600mg every 6 hours as needed for pain control. Please call office if pain is not controlled on over the counter medications.  BATHING: Please do not submerge wound underwater. You may shower and/or sponge bathe starting two days after surgery.  ACTIVITY: No heavy lifting or straining. Otherwise, you may return to your usual level of physical activity. If you are taking narcotic pain medication (such as Oxycodone), do NOT drive a car, operate machinery or make important decisions.  DIET: Regular diet.  NOTIFY YOUR SURGEON IF: You have any bleeding that does not stop, any pus draining from your wound, any fever (over 100.4 F) or chills, persistent nausea/vomiting, persistent diarrhea, or if your pain is not controlled on your discharge pain medications.  FOLLOW-UP: Follow-up with Dr. De La Rosa within 1-2 weeks of surgery.  Please call office for appointment

## 2025-02-25 NOTE — ED PROVIDER NOTE - OBJECTIVE STATEMENT
44-year-old male with past medical history of asthma presents to the ED complaining of generalized abdominal pain x 1 day.  Patient states starting on Friday he began to have nausea and multiple episodes of nonbilious nonbloody emesis.  Patient  endorses itchy throat associated with congestion and chills which have symptomatically resolved.  Patient states that today he began to have sharp abdominal pain located to the epigastric region.  Patient denies chest pain, shortness of breath, fevers, chills, abdominal surgeries, constipation, diarrhea, EtOH abuse or drug abuse or any other concerning symptoms at this time.

## 2025-02-25 NOTE — H&P ADULT - NSICDXPASTMEDICALHX_GEN_ALL_CORE_FT
PAST MEDICAL HISTORY:  Asthma     Bronchitis, allergic      Simple: Patient demonstrates quick and easy understanding/Verbalized Understanding

## 2025-02-25 NOTE — ED PROVIDER NOTE - PHYSICAL EXAMINATION
Vitals signed reviewed  General: Well appearing, NAD  HEENT: NC/AT, PERRLA, EOM intact with no pain, MMM  Neck: full ROM, no trachea deviation  Heart: RRR, normal s1/s2  Lungs: CTAB, normal WOB, no wheezing, rales, or rhonchi   Abdomen: Soft, non-distended, non-tender,  rebounding or guarding   + TTP epigastric and periumbilical area.

## 2025-02-25 NOTE — PATIENT PROFILE ADULT - FALL HARM RISK - HARM RISK INTERVENTIONS

## 2025-02-25 NOTE — PRE-OP CHECKLIST - PATIENT PROBLEMS/NEEDS
General Surgery Daily Progress Note    Patient: Miguel Ángel Brian MRN: [id number]  SSN: [id number]    YOB: 1956  Age: 58 y.o. Sex: female      Admit Date: 3/12/2018    Subjective:   C/o right side abdominal pain and nausea.      Current Facility-Administered Medications   Medication Dose Route Frequency    piperacillin-tazobactam (ZOSYN) 3.375 g in 0.9% sodium chloride (MBP/ADV) 100 mL ADV  3.375 g IntraVENous Q12H    amLODIPine (NORVASC) tablet 10 mg  10 mg Oral DAILY    ferrous sulfate tablet 325 mg  325 mg Oral DAILY    levothyroxine (SYNTHROID) tablet 100 mcg  100 mcg Oral ACB    metoprolol tartrate (LOPRESSOR) tablet 100 mg  100 mg Oral QHS    sodium chloride (NS) flush 5-10 mL  5-10 mL IntraVENous Q8H    sodium chloride (NS) flush 5-10 mL  5-10 mL IntraVENous PRN    acetaminophen (TYLENOL) tablet 650 mg  650 mg Oral Q4H PRN    HYDROcodone-acetaminophen (NORCO) 5-325 mg per tablet 1 Tab  1 Tab Oral Q4H PRN    HYDROmorphone (PF) (DILAUDID) injection 0.2 mg  0.2 mg IntraVENous Q4H PRN    naloxone (NARCAN) injection 0.4 mg  0.4 mg IntraVENous PRN    diphenhydrAMINE (BENADRYL) injection 12.5 mg  12.5 mg IntraVENous Q4H PRN    ondansetron (ZOFRAN) injection 4 mg  4 mg IntraVENous Q6H PRN    docusate sodium (COLACE) capsule 100 mg  100 mg Oral BID    insulin lispro (HUMALOG) injection   SubCUTAneous AC&HS    glucose chewable tablet 16 g  4 Tab Oral PRN    dextrose (D50W) injection syrg 12.5-25 g  12.5-25 g IntraVENous PRN    glucagon (GLUCAGEN) injection 1 mg  1 mg IntraMUSCular PRN    0.9% sodium chloride infusion 250 mL  250 mL IntraVENous PRN    prochlorperazine (COMPAZINE) injection 5 mg  5 mg IntraVENous Q6H PRN    0.9% sodium chloride infusion  100 mL/hr IntraVENous CONTINUOUS    lidocaine (XYLOCAINE) 10 mg/mL (1 %) injection 10-30 mL  10-30 mL SubCUTAneous Multiple    heparin (porcine) 1,000 unit/mL injection 2,800 Units  2,800 Units IntraVENous Multiple    albuterol-ipratropium (DUO-NEB) 2.5 MG-0.5 MG/3 ML  3 mL Nebulization Q4H PRN    azithromycin (ZITHROMAX) 500 mg in 0.9% sodium chloride 250 mL IVPB  500 mg IntraVENous Q24H        Objective:      03/12 1901 - 03/14 0700  In: 2557.1 [P.O.:100; I.V.:2170.8]  Out: 1000 [Urine:1000]  Patient Vitals for the past 8 hrs:   BP Temp Pulse Resp SpO2 Weight   03/14/18 0700 178/71 98.1 °F (36.7 °C) 70 11 93 % -   03/14/18 0533 - - - - - 64.5 kg (142 lb 3.2 oz)   03/14/18 0400 145/73 98.3 °F (36.8 °C) 76 17 96 % -       Physical Exam:  General: Alert, cooperative, NAD  Lungs: Unlabored  Heart:  Regular rate and  rhythm  Abdomen: Soft, mild RLQ tenderness w/o guarding or rebound, non-distended. Extremities: Warm, moves all, no edema  Skin:  Warm and dry, no rash    Labs: Recent Labs      03/14/18   0103   WBC  5.5   HGB  8.0*   HCT  23.9*   PLT  200     Recent Labs      03/14/18   0103   NA  135*   K  4.0   CL  98   CO2  22   GLU  119*   BUN  53*   CREA  5.96*   CA  6.9*   MG  1.7   PHOS  4.2   ALB  2.4*   TBILI  0.3   SGOT  34   ALT  52       Assessment / Plan:   · Enlarged appendix on CT imaging  · Patient now reporting right side abdominal pain and nausea. · Continue IV Zosyn  · Will consider appendectomy when medically appropriate    Dr. Summer Hanna updated. Plan for lap appendectomy following HD today. Patient expressed no known problems or needs

## 2025-02-25 NOTE — H&P ADULT - HISTORY OF PRESENT ILLNESS
44-year-old male with past medical history of asthma presents to the ED complaining of generalized abdominal pain x 1 day.  Patient states starting on Friday he began to have nausea and multiple episodes of nonbilious nonbloody emesis.  Patient  endorses itchy throat associated with congestion and chills which have symptomatically resolved.  Patient states that today he began to have sharp abdominal pain located to the epigastric region.  Patient denies chest pain, shortness of breath, fevers, chills, abdominal surgeries, constipation, diarrhea, EtOH abuse or drug abuse or any other concerning symptoms at this time    In the ED, patient is afebrile, HD stable, and saturating well on RA.

## 2025-02-25 NOTE — BRIEF OPERATIVE NOTE - NSICDXBRIEFPOSTOP_GEN_ALL_CORE_FT
Detail Level: Detailed Depth Of Biopsy: dermis Was A Bandage Applied: Yes Size Of Lesion In Cm: 0.6 X Size Of Lesion In Cm: 0 Biopsy Type: H and E POST-OP DIAGNOSIS:  Acute gangrenous appendicitis 26-Feb-2025 00:54:48  Antonietta Cárdenas   Biopsy Method: Dermablade Anesthesia Type: 1% lidocaine with epinephrine Anesthesia Volume In Cc: 0.5 Hemostasis: Silver Nitrate Wound Care: Petrolatum Dressing: bandage Destruction After The Procedure: No Type Of Destruction Used: Curettage Curettage Text: The wound bed was treated with curettage after the biopsy was performed. Cryotherapy Text: The wound bed was treated with cryotherapy after the biopsy was performed. Electrodesiccation Text: The wound bed was treated with electrodesiccation after the biopsy was performed. Electrodesiccation And Curettage Text: The wound bed was treated with electrodesiccation and curettage after the biopsy was performed. Silver Nitrate Text: The wound bed was treated with silver nitrate after the biopsy was performed. Lab: [id number] Lab Facility:  Consent: Written consent was obtained and risks were reviewed including but not limited to scarring, infection, bleeding, scabbing, incomplete removal, nerve damage and allergy to anesthesia. Post-Care Instructions: I reviewed with the patient in detail post-care instructions. Patient is to keep the biopsy site dry overnight, and then apply bacitracin twice daily until healed. Patient may apply hydrogen peroxide soaks to remove any crusting. Notification Instructions: Patient will be notified of biopsy results. However, patient instructed to call the office if not contacted within 2 weeks. Billing Type: Third-Party Bill Information: Selecting Yes will display possible errors in your note based on the variables you have selected. This validation is only offered as a suggestion for you. PLEASE NOTE THAT THE VALIDATION TEXT WILL BE REMOVED WHEN YOU FINALIZE YOUR NOTE. IF YOU WANT TO FAX A PRELIMINARY NOTE YOU WILL NEED TO TOGGLE THIS TO 'NO' IF YOU DO NOT WANT IT IN YOUR FAXED NOTE.

## 2025-02-25 NOTE — H&P ADULT - NSHPPHYSICALEXAM_GEN_ALL_CORE
Gen: NAD  HEENT: Atrumatic. Supple neck. No JVD  Resp: No acute distress  Cards: RRR  Abd: soft, ND/ tender in the RLQ. No rebound tenderness   Extremities: grossly normal

## 2025-02-25 NOTE — ED ADULT NURSE REASSESSMENT NOTE - NS ED NURSE REASSESS COMMENT FT1
Upon reassessment pt sleeping in stretcher arousable to verbal and tactile stimuli. Respirations even and unlabored on room air. No acute distress noted. Pt endorsing acceptable pain level at this time. Denies headache, dizziness, chest pain, SOB, nausea and vomiting at this time. Pt medicated per EMAR. Plan of care ongoing, comfort measures provided and safety measures maintained. Pt brought to bed assignment via wheelchair.

## 2025-02-25 NOTE — H&P ADULT - ATTENDING COMMENTS
Patient with acute appendicitis based on clinical exam labs and imaging  plan  or for lap vs robotic appy  iv abx  npo, ivf    I have personally interviewed and examined this patient, reviewed pertinent labs and imaging, and discussed the case with colleagues, residents, and physician assistants on B Team rounds.    The active care issues are:  1. acute appendicitis    [facility] ([facility]    urgent issues - spectra [id number]  nonurgent issues - [phone]  patient appointments or afterhours - [phone]

## 2025-02-25 NOTE — ED PROVIDER NOTE - CLINICAL SUMMARY MEDICAL DECISION MAKING FREE TEXT BOX
44-year-old male with past medical history of asthma presents to the ED complaining of generalized abdominal pain x 1 day.  Patient states starting on Friday he began to have nausea and multiple episodes of nonbilious nonbloody emesis.  Patient  endorses itchy throat associated with congestion and chills which have symptomatically resolved.  Patient states that today he began to have sharp abdominal pain located to the epigastric region. Physical exam as above,    Impression: will r/o biliary colic vs acute cholecystitis vs pancreatitis vs other intraabdominal pathology    plan:    labs, CT, US, pain control    dispo pending workup

## 2025-02-25 NOTE — ASU DISCHARGE PLAN (ADULT/PEDIATRIC) - CARE PROVIDER_API CALL
Aníbal De La Rosa  Surgery  [id number] [place] [id number]  Phone: [phone]  Fax: [phone]  Follow Up Time: 2 weeks

## 2025-02-25 NOTE — H&P ADULT - ASSESSMENT
A 44 year old male with no PMHx presents with 1-day of abdominal pain localized now to the RLQ. WBCs 13K. CT scan shows appendicitis with a proximal appendcolith.    Plan:  - Admit  - NPO  - IVF  - IV antibiotics  - Consented  - Added on to the OR  - DVT PPx  - Pain management PRN    B-Team  [id number]

## 2025-02-25 NOTE — ED ADULT NURSE NOTE - OBJECTIVE STATEMENT
Pt arrives to intake room 8 A&Ox3 and ambulatory at baseline. PH of Asthma. Pt comes to ED c/o mid-epigastric pain with associated nausea and vomiting since yesterday. Denies headache, dizziness, chest pain, SOB, fevers, chills, and diarrhea. Pt reports last bowel movement was regular yesterday, endorses 3 episodes of vomiting. Respirations even and unlabored on room air. No acute distress noted. 20 gauge placed to L AC, labs drawn and sent. Pt medicated per EMAR. Plan of care ongoing, comfort measures provided and safety measures maintained. Awaiting CT and XR.

## 2025-02-25 NOTE — ASU DISCHARGE PLAN (ADULT/PEDIATRIC) - FINANCIAL ASSISTANCE
[facility] provides services at a reduced cost to those who are determined to be eligible through [facility]’s financial assistance program. Information regarding [facility]’s financial assistance program can be found by going to [email or web address] or by calling [phone].

## 2025-02-25 NOTE — BRIEF OPERATIVE NOTE - OPERATION/FINDINGS
Abdomen entered via Ponce technique using supraumbilical incision; 12mm Ponce trocar placed. 5mm trocars placed suprapubic and LLQ after abdominal insufflation under direct visualization. Appendix acutely inflamed with appendicolith, and gangrenous. Mesoappendix divided using ligasure. During dissection, appendix divided into two pieces, appendicoliths spilled and suctioned. Cecum mobilized for full exposure of base of appendix. Base divided using endogia tan 45mm stapler. Hemostasis achieved. Appendix placed in endocatch and removed from supraumbilical incision. Fascia closed using 0 vicryl. Skin closed using 4-0 monocryl.

## 2025-02-26 ENCOUNTER — TRANSCRIPTION ENCOUNTER (OUTPATIENT)
Age: 45
End: 2025-02-26

## 2025-02-26 VITALS
HEART RATE: 88 BPM | OXYGEN SATURATION: 98 % | RESPIRATION RATE: 18 BRPM | SYSTOLIC BLOOD PRESSURE: 108 MMHG | TEMPERATURE: 98 F | DIASTOLIC BLOOD PRESSURE: 70 MMHG

## 2025-02-26 RX ORDER — ACETAMINOPHEN 500 MG/5ML
975 LIQUID (ML) ORAL EVERY 6 HOURS
Refills: 0 | Status: DISCONTINUED | OUTPATIENT
Start: 2025-02-26 | End: 2025-02-26

## 2025-02-26 RX ORDER — OXYCODONE HYDROCHLORIDE 30 MG/1
1 TABLET ORAL
Qty: 5 | Refills: 0
Start: 2025-02-26

## 2025-02-26 RX ADMIN — Medication 975 MILLIGRAM(S): at 10:30

## 2025-02-26 RX ADMIN — Medication 600 MILLIGRAM(S): at 12:25

## 2025-02-26 RX ADMIN — Medication 600 MILLIGRAM(S): at 06:41

## 2025-02-26 RX ADMIN — ENOXAPARIN SODIUM 40 MILLIGRAM(S): 100 INJECTION SUBCUTANEOUS at 06:41

## 2025-02-26 RX ADMIN — Medication 25 GRAM(S): at 06:41

## 2025-02-26 RX ADMIN — Medication 600 MILLIGRAM(S): at 13:15

## 2025-02-26 RX ADMIN — Medication 975 MILLIGRAM(S): at 09:52

## 2025-02-26 NOTE — DISCHARGE NOTE NURSING/CASE MANAGEMENT/SOCIAL WORK - NSDCPECAREGIVERED_GEN_ALL_CORE
Medline and carenotes for surgical procedure Lap AP, Incision care, pain mgt, as well as DC Medications and side effects literature for patient reference.

## 2025-02-26 NOTE — DISCHARGE NOTE PROVIDER - HOSPITAL COURSE
44-year-old male with past medical history of asthma presents to the ED complaining of generalized abdominal pain x 1 day. Endorses nausea and multiple episodes of nonbilious nonbloody emesis.  Patient  endorses itchy throat associated with congestion and chills which have symptomatically resolved.  Patient states the day of presentation, he began to have sharp abdominal pain located to the epigastric region.  Patient denies chest pain, shortness of breath, fevers, chills, abdominal surgeries, constipation, diarrhea, EtOH abuse or drug abuse or any other concerning symptoms at this time. CT scan shows appendicitis with a proximal appendicolith.    Patient admitted to B team surgery, made NPO, and started on IVF and IV antibiotics. Patient brought to the OR on 2/25, underwent a laparoscopic appendectomy. Patient tolerated procedure well, no complications noted. Diet advanced to regular postop.     At the time of discharge, the patient was hemodynamically stable, was tolerating PO diet, was voiding urine and passing stool. Pt was ambulating and was comfortable with adequate pain control.  The patient was instructed to follow up with Dr. De La Rosa within 1-2 weeks after discharge from the hospital.  The patient felt comfortable with discharge.  The patient had no other issues.    Per attending, patient deemed medically stable and ready for discharge at this time.

## 2025-02-26 NOTE — DISCHARGE NOTE NURSING/CASE MANAGEMENT/SOCIAL WORK - FINANCIAL ASSISTANCE
[facility] provides services at a reduced cost to those who are determined to be eligible through [facility]’s financial assistance program. Information regarding [facility]’s financial assistance program can be found by going to [email or web address] or by calling [phone].

## 2025-02-26 NOTE — DISCHARGE NOTE NURSING/CASE MANAGEMENT/SOCIAL WORK - PATIENT PORTAL LINK FT
You can access the FollowMyHealth Patient Portal offered by [facility] by registering at the following website: [email or web address]. By joining [facility]’s FollowMyHealth portal, you will also be able to view your health information using other applications (apps) compatible with our system.

## 2025-02-26 NOTE — PROGRESS NOTE ADULT - ASSESSMENT
A 44 year old male with no PMHx presents with 1-day of abdominal pain localized now to the RLQ. WBCs 13K. CT scan shows appendicitis with a proximal appendicolith Patient now s/p laparoscopic appendectomy on 2/25. Patient tolerated procedure well, no complications noted.    PLAN:    - dvt ppx w/ lovenox 40qd  - IV Zosyn while inpt  - pain control w/ Tylenol Motrin oxycodone prn   - diet: regular as tolerated  - DISPO: dc home w/ 3d PO Augmentin    B team surgery  [id number]

## 2025-02-26 NOTE — DISCHARGE NOTE PROVIDER - CARE PROVIDER_API CALL
Aníbal De La Rosa  Surgery  [id number] [place] [id number]  Phone: [phone]  Fax: [phone]  Follow Up Time: 2 weeks

## 2025-02-26 NOTE — DISCHARGE NOTE PROVIDER - NSDCCPTREATMENT_GEN_ALL_CORE_FT
PRINCIPAL PROCEDURE  Procedure: Laparoscopic appendectomy  Findings and Treatment: WOUND CARE:  Please keep incisions clean and dry. Please do not Scrub or rub incisions. Do not use lotion or powder on incisions.   BATHING: You may shower and/or sponge bathe. You may use warm soapy water in the shower and rinse, pat dry.  ACTIVITY: No heavy lifting or straining. Otherwise, you may return to your usual level of physical activity. If you are taking narcotic pain medication DO NOT drive a car, operate machinery or make important decisions.  DIET: Return to your usual diet.  NOTIFY YOUR SURGEON IF YOU HAVE: any bleeding that does not stop, any pus draining from your wound(s), any fever (over 100.4 F) persistent nausea/vomiting, or if your pain is not controlled on your discharge pain medications, unable to urinate.

## 2025-02-26 NOTE — DISCHARGE NOTE PROVIDER - NSDCMRMEDTOKEN_GEN_ALL_CORE_FT
amoxicillin-clavulanate 875 mg-125 mg oral tablet: 875 milligram(s) orally 2 times a day  ibuprofen 600 mg oral tablet: 1 tab(s) orally every 6 hours  Tylenol 325 mg oral tablet: 3 tab(s) orally every 6 hours   amoxicillin-clavulanate 875 mg-125 mg oral tablet: 875 milligram(s) orally 2 times a day  ibuprofen 600 mg oral tablet: 1 tab(s) orally every 6 hours  oxyCODONE 5 mg oral tablet: 1 tab(s) orally every 6 hours as needed for  severe pain MDD: 4  Tylenol 325 mg oral tablet: 3 tab(s) orally every 6 hours as needed for  mild pain

## 2025-02-26 NOTE — DISCHARGE NOTE NURSING/CASE MANAGEMENT/SOCIAL WORK - NSDCPEFALRISK_GEN_ALL_CORE
For information on Fall & Injury Prevention, visit: [email or web address] OR  [email or web address] OR  https://www.cdc.gov/steadi/patient.html

## 2025-02-26 NOTE — PROGRESS NOTE ADULT - SUBJECTIVE AND OBJECTIVE BOX
ANESTHESIA POSTOP CHECK    44y Male POSTOP DAY 1 S/P Lap Appy    Vital Signs Last 24 Hrs  T(C): 36.3 (26 Feb 2025 09:08), Max: 38.7 (25 Feb 2025 12:44)  T(F): 97.4 (26 Feb 2025 09:08), Max: 101.6 (25 Feb 2025 12:44)  HR: 90 (26 Feb 2025 09:08) (82 - 125)  BP: 110/65 (26 Feb 2025 09:08) (93/55 - 110/65)  BP(mean): 81 (25 Feb 2025 21:30) (76 - 88)  RR: 17 (26 Feb 2025 09:08) (15 - 19)  SpO2: 100% (26 Feb 2025 09:08) (95% - 100%)    Parameters below as of 26 Feb 2025 09:08  Patient On (Oxygen Delivery Method): room air      I&O's Summary    25 Feb 2025 07:01  -  26 Feb 2025 07:00  --------------------------------------------------------  IN: 1320 mL / OUT: 1625 mL / NET: -305 mL    26 Feb 2025 07:01  -  26 Feb 2025 12:31  --------------------------------------------------------  IN: 360 mL / OUT: 0 mL / NET: 360 mL        [X] NO APPARENT ANESTHESIA COMPLICATIONS      Comments: 
B TEAM Surgery Progress Note  Patient is a 44y old  Male who presents with a chief complaint of Abdominal Pain (26 Feb 2025 10:21)    SUBJECTIVE: Patient seen and examined at bedside with surgical team, patient without complaints. Denies fever, chills, CP, SOB nausea, vomiting, abdominal pain.    OBJECTIVE:    Vital Signs Last 24 Hrs  T(C): 36.3 (26 Feb 2025 09:08), Max: 38.7 (25 Feb 2025 12:44)  T(F): 97.4 (26 Feb 2025 09:08), Max: 101.6 (25 Feb 2025 12:44)  HR: 90 (26 Feb 2025 09:08) (82 - 125)  BP: 110/65 (26 Feb 2025 09:08) (93/55 - 110/65)  BP(mean): 81 (25 Feb 2025 21:30) (76 - 88)  RR: 17 (26 Feb 2025 09:08) (15 - 19)  SpO2: 100% (26 Feb 2025 09:08) (95% - 100%)    Parameters below as of 26 Feb 2025 09:08  Patient On (Oxygen Delivery Method): room air    I&O's Detail    25 Feb 2025 07:01  -  26 Feb 2025 07:00  --------------------------------------------------------  IN:    Lactated Ringers: 600 mL    Oral Fluid: 720 mL  Total IN: 1320 mL    OUT:    Voided (mL): 1625 mL  Total OUT: 1625 mL    Total NET: -305 mL      26 Feb 2025 07:01  -  26 Feb 2025 10:43  --------------------------------------------------------  IN:    Oral Fluid: 360 mL  Total IN: 360 mL    OUT:  Total OUT: 0 mL    Total NET: 360 mL      MEDICATIONS  (STANDING):  acetaminophen     Tablet .. 975 milliGRAM(s) Oral every 6 hours  enoxaparin Injectable 40 milliGRAM(s) SubCutaneous every 24 hours  ibuprofen  Tablet. 600 milliGRAM(s) Oral every 6 hours  influenza   Vaccine 0.5 milliLiter(s) IntraMuscular once  piperacillin/tazobactam IVPB.. 3.375 Gram(s) IV Intermittent every 8 hours    MEDICATIONS  (PRN):  oxyCODONE    IR 2.5 milliGRAM(s) Oral every 4 hours PRN Moderate Pain (4 - 6)  oxyCODONE    IR 5 milliGRAM(s) Oral every 4 hours PRN Severe Pain (7 - 10)      PHYSICAL EXAM:  Constitutional: A&Ox3, NAD  Respiratory: Unlabored breathing on RA  Abdomen: Soft, nondistended, NTTP. No rebound or guarding. Incisions C/D/I  Extremities: WWP, MARIE spontaneously    LABS:                        12.2   13.14 )-----------( 251      ( 25 Feb 2025 00:32 )             40.1     02-25    136  |  97[L]  |  15  ----------------------------<  120[H]  4.1   |  23  |  0.81    Ca    8.5      25 Feb 2025 00:32  Phos  3.5     02-25  Mg     2.20     02-25    TPro  7.6  /  Alb  4.3  /  TBili  0.6  /  DBili  x   /  AST  20  /  ALT  20  /  AlkPhos  71  02-25    PT/INR - ( 25 Feb 2025 04:22 )   PT: 11.8 sec;   INR: 1.02 ratio         PTT - ( 25 Feb 2025 04:22 )  PTT:29.8 sec  LIVER FUNCTIONS - ( 25 Feb 2025 00:32 )  Alb: 4.3 g/dL / Pro: 7.6 g/dL / ALK PHOS: 71 U/L / ALT: 20 U/L / AST: 20 U/L / GGT: x           Urinalysis Basic - ( 25 Feb 2025 00:32 )    Color: x / Appearance: x / SG: x / pH: x  Gluc: 120 mg/dL / Ketone: x  / Bili: x / Urobili: x   Blood: x / Protein: x / Nitrite: x   Leuk Esterase: x / RBC: x / WBC x   Sq Epi: x / Non Sq Epi: x / Bacteria: x

## 2025-02-26 NOTE — PROVIDER CONTACT NOTE (OTHER) - ASSESSMENT
Pt A/Ox4 and asymptomatic. Scant drainage at umbilical lap site, other lap sites c/d/i. Steri strips intact on all 3 sites.

## 2025-02-26 NOTE — DISCHARGE NOTE NURSING/CASE MANAGEMENT/SOCIAL WORK - NSDCPNINST_GEN_ALL_CORE
Maintain abdominal incision/scope sites clean and dry, call MD with any signs of infection such as fever, redness or drainage from site. Continue to drink plenty of fluids, avoid strenuous activity and heavy lifting (nothing greater than 10 pounds) , as well as constipation which may be a side effect from taking narcotic pain medication. Follow up with surgeon in the office for post-op check as instructed, as well as PMD for continuity of care.

## 2025-03-05 LAB — SURGICAL PATHOLOGY STUDY: SIGNIFICANT CHANGE UP

## 2025-03-11 PROBLEM — J45.909 UNSPECIFIED ASTHMA, UNCOMPLICATED: Chronic | Status: ACTIVE | Noted: 2025-02-25

## 2025-03-21 ENCOUNTER — APPOINTMENT (OUTPATIENT)
Dept: TRAUMA SURGERY | Facility: HOSPITAL | Age: 45
End: 2025-03-21

## 2025-03-21 VITALS
SYSTOLIC BLOOD PRESSURE: 122 MMHG | HEART RATE: 96 BPM | TEMPERATURE: 99 F | DIASTOLIC BLOOD PRESSURE: 69 MMHG | BODY MASS INDEX: 22.71 KG/M2 | HEIGHT: 64 IN | WEIGHT: 133 LBS

## (undated) DEVICE — TUBING INSUFFLATION LAP FILTER 10FT

## (undated) DEVICE — SOL IRR POUR NS 0.9% 500ML

## (undated) DEVICE — DRSG TEGADERM 2.5 X 3"

## (undated) DEVICE — TUBING STRYKEFLOW II SUCTION / IRRIGATOR

## (undated) DEVICE — DRAPE WARMING SOLUTION 44 X 44"

## (undated) DEVICE — LIGASURE IMPACT

## (undated) DEVICE — SOL IRR POUR H2O 500ML

## (undated) DEVICE — TROCAR COVIDIEN VERSAPORT BLADELESS OPTICAL 5MM STANDARD

## (undated) DEVICE — D HELP - CLEARVIEW CLEARIFY SYSTEM

## (undated) DEVICE — TIP METZENBAUM SCISSOR MONOPOLAR ENDOCUT (ORANGE)

## (undated) DEVICE — VENODYNE/SCD SLEEVE CALF MEDIUM

## (undated) DEVICE — BASIN SET SINGLE

## (undated) DEVICE — PACK GENERAL LAPAROSCOPY

## (undated) DEVICE — PROTECTOR HEEL / ELBOW FLUFFY

## (undated) DEVICE — STAPLER COVIDIEN ENDO GIA STANDARD HANDLE

## (undated) DEVICE — WARMING BLANKET FULL ADULT

## (undated) DEVICE — GLV 7.5 PROTEXIS (CREAM) MICRO

## (undated) DEVICE — ELCTR BOVIE PENCIL SMOKE EVACUATION

## (undated) DEVICE — TUBING OLYMPUS INSUFFLATION

## (undated) DEVICE — ENDOCATCH 10MM

## (undated) DEVICE — SUT MONOCRYL 4-0 27" PS-2 UNDYED

## (undated) DEVICE — DRSG BENZOIN 0.6CC

## (undated) DEVICE — ELCTR GROUNDING PAD ADULT COVIDIEN

## (undated) DEVICE — DISSECTOR ENDOSCOPIC KITTNER SINGLE TIP

## (undated) DEVICE — TUBING HYDRO-SURG PLUS IRRIGATOR W SMOKEVAC & PROBE

## (undated) DEVICE — DRSG TELFA 3 X 8

## (undated) DEVICE — SUT VICRYL 0 27" UR-6

## (undated) DEVICE — TROCAR COVIDIEN BLUNT TIP HASSAN 12MM STANDARD

## (undated) DEVICE — POSITIONER STRAP ARMBOARD VELCRO TS-30

## (undated) DEVICE — BLADE SURGICAL #15 CARBON

## (undated) DEVICE — TROCAR COVIDIEN BLUNT TIP HASSAN 12MM

## (undated) DEVICE — DRSG STERISTRIPS 0.5 X 4"

## (undated) DEVICE — LIGASURE MARYLAND 5MM X 37CM